# Patient Record
Sex: FEMALE | Race: WHITE | NOT HISPANIC OR LATINO | URBAN - METROPOLITAN AREA
[De-identification: names, ages, dates, MRNs, and addresses within clinical notes are randomized per-mention and may not be internally consistent; named-entity substitution may affect disease eponyms.]

---

## 2021-05-20 ENCOUNTER — TELEPHONE ENCOUNTER (OUTPATIENT)
Dept: URBAN - METROPOLITAN AREA CLINIC 35 | Facility: CLINIC | Age: 26
End: 2021-05-20

## 2021-05-20 ENCOUNTER — OFFICE VISIT (OUTPATIENT)
Dept: URBAN - METROPOLITAN AREA CLINIC 35 | Facility: CLINIC | Age: 26
End: 2021-05-20

## 2021-05-20 VITALS
HEIGHT: 64 IN | DIASTOLIC BLOOD PRESSURE: 68 MMHG | BODY MASS INDEX: 25.61 KG/M2 | WEIGHT: 150 LBS | TEMPERATURE: 98.6 F | OXYGEN SATURATION: 97 % | SYSTOLIC BLOOD PRESSURE: 102 MMHG | HEART RATE: 90 BPM

## 2021-05-20 RX ORDER — HYOSCYAMINE SULFATE 0.12 MG/1
1 TABLET AS NEEDED TABLET ORAL
Qty: 30 | Refills: 0 | OUTPATIENT
Start: 2021-05-20

## 2021-05-20 RX ORDER — SOD SULF/POT CHLORIDE/MAG SULF 1.479 G
AS DIRECTED TABLET ORAL
OUTPATIENT
Start: 2021-05-24

## 2021-05-20 RX ORDER — SODIUM PICOSULFATE, MAGNESIUM OXIDE, AND ANHYDROUS CITRIC ACID 10; 3.5; 12 MG/160ML; G/160ML; G/160ML
160 ML LIQUID ORAL
Qty: 1 KIT | Refills: 0 | OUTPATIENT
Start: 2021-05-20

## 2021-05-20 RX ORDER — ETHINYL ESTRADIOL/DROSPIRENONE 0.02-3(28)
1 TABLET TABLET ORAL ONCE A DAY
Qty: 28 | Status: ACTIVE | COMMUNITY

## 2021-05-20 NOTE — HPI-MIGRATED HPI
;   ;     Change in bowel habits : Patient states her bowel habits have changed over the past two years. She started experiencing diarrhea multiple times a week. She also started having some abd cramping prior to BMs.  She states stress seems to be a factor.  She states she was not having diarrhea prior.;   Diarrhea : 26 year old  female presents today for a consult of diarrhea and abdominal pain.  She admits symptoms started approximately 2 years ago off and on.  She states she related it to stress, but the last 2 months symptoms has worsened.  Abdominal pain is located in the lower abdomen. States it feels like cramping sometimes sharp prior to BM and majority of the time, it improves after a BM.      She states she has cut back on gluten and dairy, and since then her diarrhea has gotten better. She was having approximately 4 to 5 loose bowel movements weekly.  She now has diarrhea once a week.  She saw a prior GI, and felt they were very expensive.  She had workup for her diarrhea, and labs drawn, but does not know results, because they were going to charge her for results.  No weight loss with the diarrhea, but lost 15 pounds since not eating gluten or dairy.  No nocturnal diarrhea.  She now admits to typically having 1 bowel movement daily.  States her stools are formed.  She admits to seeing bright red blood in her stools twice since January 2021.  She denies any melena or mucus in her stool.;

## 2021-06-17 ENCOUNTER — WEB ENCOUNTER (OUTPATIENT)
Dept: URBAN - METROPOLITAN AREA CLINIC 35 | Facility: CLINIC | Age: 26
End: 2021-06-17

## 2021-06-23 ENCOUNTER — OFFICE VISIT (OUTPATIENT)
Dept: URBAN - METROPOLITAN AREA SURGERY CENTER 8 | Facility: SURGERY CENTER | Age: 26
End: 2021-06-23

## 2021-07-07 ENCOUNTER — OFFICE VISIT (OUTPATIENT)
Dept: URBAN - METROPOLITAN AREA CLINIC 35 | Facility: CLINIC | Age: 26
End: 2021-07-07

## 2021-07-09 ENCOUNTER — TELEPHONE ENCOUNTER (OUTPATIENT)
Dept: URBAN - METROPOLITAN AREA CLINIC 35 | Facility: CLINIC | Age: 26
End: 2021-07-09

## 2021-07-12 ENCOUNTER — OFFICE VISIT (OUTPATIENT)
Dept: URBAN - METROPOLITAN AREA CLINIC 35 | Facility: CLINIC | Age: 26
End: 2021-07-12

## 2021-07-12 ENCOUNTER — DASHBOARD ENCOUNTERS (OUTPATIENT)
Age: 26
End: 2021-07-12

## 2021-07-12 VITALS
BODY MASS INDEX: 25.61 KG/M2 | WEIGHT: 150 LBS | OXYGEN SATURATION: 98 % | HEART RATE: 73 BPM | SYSTOLIC BLOOD PRESSURE: 110 MMHG | DIASTOLIC BLOOD PRESSURE: 65 MMHG | HEIGHT: 64 IN

## 2021-07-12 PROBLEM — 197125005 IRRITABLE BOWEL SYNDROME WITH DIARRHEA: Status: ACTIVE | Noted: 2021-07-12

## 2021-07-12 RX ORDER — HYOSCYAMINE SULFATE 0.12 MG/1
1 TABLET AS NEEDED TABLET ORAL
Qty: 30 | Refills: 0 | Status: ACTIVE | COMMUNITY
Start: 2021-05-20

## 2021-07-12 RX ORDER — SODIUM PICOSULFATE, MAGNESIUM OXIDE, AND ANHYDROUS CITRIC ACID 10; 3.5; 12 MG/160ML; G/160ML; G/160ML
160 ML LIQUID ORAL
Qty: 1 KIT | Refills: 0 | Status: ON HOLD | COMMUNITY
Start: 2021-05-20

## 2021-07-12 RX ORDER — HYOSCYAMINE SULFATE 0.12 MG/1
1 TABLET AS NEEDED TABLET ORAL
Qty: 30 | Refills: 3 | OUTPATIENT
Start: 2021-05-20

## 2021-07-12 RX ORDER — ETHINYL ESTRADIOL/DROSPIRENONE 0.02-3(28)
1 TABLET TABLET ORAL ONCE A DAY
Qty: 28 | Status: ACTIVE | COMMUNITY

## 2021-07-12 RX ORDER — SOD SULF/POT CHLORIDE/MAG SULF 1.479 G
AS DIRECTED TABLET ORAL
Status: ON HOLD | COMMUNITY
Start: 2021-05-24

## 2021-07-12 NOTE — HPI-MIGRATED HPI
;   ;     Change in bowel habits : She denies any recent changes to her bowel habits since her last office visit. Patient currently admits 1-2 bowel movements per day.  Stools are formed to loose.  Patient denies blood, mucus, or melena in stools. She continues to deny associated abdominal pains, bloating, and gas. She states that her symptoms are being controlled by gluten free/ dairy free diet.  When she has a bowel movement she does feel like she completely empties her bowels.    Last visit (05/20/2021) Patient states her bowel habits have changed over the past two years. She started experiencing diarrhea multiple times a week. She also started having some abd cramping prior to BMs.  She states stress seems to be a factor.  She states she was not having diarrhea prior.;   Diarrhea : 26 year old  female patient presents today for follow up to her colonoscopy.  Patient denies any complications after her procedure. Patient currently admits 1-2 bowel movements a day.  Stools are formed or about once a week it could be diarrhea.  Patient denies any melena, blood or mucus in stools.      Last visit (05/20/2021) 26 year old  female presents today for a consult of diarrhea and abdominal pain.  She admits symptoms started approximately 2 years ago off and on.  She states she related it to stress, but the last 2 months symptoms has worsened.  Abdominal pain is located in the lower abdomen. States it feels like cramping sometimes sharp prior to BM and majority of the time, it improves after a BM.      She states she has cut back on gluten and dairy, and since then her diarrhea has gotten better. She was having approximately 4 to 5 loose bowel movements weekly.  She now has diarrhea once a week.  She saw a prior GI, and felt they were very expensive.  She had workup for her diarrhea, and labs drawn, but does not know results, because they were going to charge her for results.  No weight loss with the diarrhea, but lost 15 pounds since not eating gluten or dairy.  No nocturnal diarrhea.  She now admits to typically having 1 bowel movement daily.  States her stools are formed.  She admits to seeing bright red blood in her stools twice since January 2021.  She denies any melena or mucus in her stool.;